# Patient Record
Sex: FEMALE | Race: WHITE | NOT HISPANIC OR LATINO
[De-identification: names, ages, dates, MRNs, and addresses within clinical notes are randomized per-mention and may not be internally consistent; named-entity substitution may affect disease eponyms.]

---

## 2017-03-24 ENCOUNTER — APPOINTMENT (OUTPATIENT)
Dept: NEUROSURGERY | Facility: CLINIC | Age: 82
End: 2017-03-24

## 2017-10-23 ENCOUNTER — EMERGENCY (EMERGENCY)
Facility: HOSPITAL | Age: 82
LOS: 1 days | Discharge: HOME | End: 2017-10-23
Attending: EMERGENCY MEDICINE
Payer: MEDICARE

## 2017-10-23 VITALS
TEMPERATURE: 98 F | SYSTOLIC BLOOD PRESSURE: 146 MMHG | RESPIRATION RATE: 16 BRPM | OXYGEN SATURATION: 98 % | HEART RATE: 63 BPM | DIASTOLIC BLOOD PRESSURE: 65 MMHG | HEIGHT: 61 IN | WEIGHT: 167.99 LBS

## 2017-10-23 VITALS
HEART RATE: 60 BPM | SYSTOLIC BLOOD PRESSURE: 131 MMHG | OXYGEN SATURATION: 98 % | DIASTOLIC BLOOD PRESSURE: 81 MMHG | RESPIRATION RATE: 16 BRPM | TEMPERATURE: 98 F

## 2017-10-23 PROCEDURE — 99285 EMERGENCY DEPT VISIT HI MDM: CPT | Mod: 25

## 2017-10-23 PROCEDURE — 90471 IMMUNIZATION ADMIN: CPT

## 2017-10-23 PROCEDURE — 90715 TDAP VACCINE 7 YRS/> IM: CPT

## 2017-10-23 PROCEDURE — 99284 EMERGENCY DEPT VISIT MOD MDM: CPT | Mod: 25

## 2017-10-23 PROCEDURE — 12002 RPR S/N/AX/GEN/TRNK2.6-7.5CM: CPT

## 2017-10-23 PROCEDURE — 70450 CT HEAD/BRAIN W/O DYE: CPT

## 2017-10-23 PROCEDURE — 70450 CT HEAD/BRAIN W/O DYE: CPT | Mod: 26

## 2017-10-23 RX ORDER — TETANUS TOXOID, REDUCED DIPHTHERIA TOXOID AND ACELLULAR PERTUSSIS VACCINE, ADSORBED 5; 2.5; 8; 8; 2.5 [IU]/.5ML; [IU]/.5ML; UG/.5ML; UG/.5ML; UG/.5ML
0.5 SUSPENSION INTRAMUSCULAR ONCE
Qty: 0 | Refills: 0 | Status: COMPLETED | OUTPATIENT
Start: 2017-10-23 | End: 2017-10-23

## 2017-10-23 RX ADMIN — TETANUS TOXOID, REDUCED DIPHTHERIA TOXOID AND ACELLULAR PERTUSSIS VACCINE, ADSORBED 0.5 MILLILITER(S): 5; 2.5; 8; 8; 2.5 SUSPENSION INTRAMUSCULAR at 12:24

## 2017-10-23 NOTE — ED ADULT NURSE NOTE - OBJECTIVE STATEMENT
Pt AOx3, ambulatory, s/p fall and head trauma. Laceration in back of head 4 cm long, mild bleeding noted. Pt denies syncope or loss of concious. Pt denies change in vision or dizziness. No acute distress noted. Seen by Dr. Serrano, zoë placed on laceration.

## 2017-10-23 NOTE — ED ADULT TRIAGE NOTE - CHIEF COMPLAINT QUOTE
lost balance and hit her head on the wall while bending to  a paper, No LOC. laceration on back of head.

## 2017-10-23 NOTE — ED PROVIDER NOTE - PHYSICAL EXAMINATION
Gen: Alert, NAD  Head: NC, 4 cm lac on occiput    Eyes: PERRL, EOMI, normal lids/conjunctiva  ENT: normal hearing, patent oropharynx without erythema/exudate, uvula midline  Neck: supple, no tenderness, Trachea midline  Pulm: Bilateral BS, normal resp effort, no wheeze/stridor/retractions  CV: RRR, no M/R/G, 2+ radial and dp pulses bl, no edema  Abd: soft, NT/ND, +BS, no hepatosplenomegaly  Mskel: extremities x4 with normal ROM and no joint effusions. no ctl spine ttp.   Skin: 4 cm laceration posterior occiput down to galea  Neuro: AAOx3, no sensory/motor deficits, CN 2-12 intact

## 2017-10-23 NOTE — ED PROVIDER NOTE - OBJECTIVE STATEMENT
Pertinent PMH/PSH/FHx/SHx and Review of Systems contained within:  87F hx of htn and arthritis pw mechanical fall. was bending down to pick something up today and fall back into wall, sustaining lac to occiput. no loc, no nausea, vomiting, vision change. bled initially tnen became hemostatic. patient denies recent tetanus  Fh and Sh not otherwise contributory  ROS otherwise negative
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
## 2017-10-27 DIAGNOSIS — I10 ESSENTIAL (PRIMARY) HYPERTENSION: ICD-10-CM

## 2017-10-27 DIAGNOSIS — M19.90 UNSPECIFIED OSTEOARTHRITIS, UNSPECIFIED SITE: ICD-10-CM

## 2017-10-27 DIAGNOSIS — S01.01XA LACERATION WITHOUT FOREIGN BODY OF SCALP, INITIAL ENCOUNTER: ICD-10-CM
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
## 2017-10-30 ENCOUNTER — EMERGENCY (EMERGENCY)
Facility: HOSPITAL | Age: 82
LOS: 1 days | Discharge: HOME | End: 2017-10-30
Attending: EMERGENCY MEDICINE | Admitting: EMERGENCY MEDICINE
Payer: MEDICARE

## 2017-10-30 VITALS
TEMPERATURE: 98 F | DIASTOLIC BLOOD PRESSURE: 70 MMHG | OXYGEN SATURATION: 98 % | RESPIRATION RATE: 18 BRPM | SYSTOLIC BLOOD PRESSURE: 156 MMHG | WEIGHT: 167.99 LBS | HEIGHT: 61 IN | HEART RATE: 64 BPM

## 2017-10-30 PROCEDURE — G0463: CPT

## 2017-10-30 NOTE — ED PROVIDER NOTE - OBJECTIVE STATEMENT
86 y/o female with PMHx of HTN and Arthritis presents to the ED for staple removal s/p hitting her head against a wall 1 week ago. Pt received 19 staples. Pt denies head pain, dizziness, fever, chills, nausea, vomiting, or any other complaints. NKDA.

## 2017-11-07 DIAGNOSIS — Z48.02 ENCOUNTER FOR REMOVAL OF SUTURES: ICD-10-CM

## 2021-12-17 NOTE — ED ADULT NURSE NOTE - CCCP TRG CHIEF CMPLNT
Personalized Preventive Plan for [facility] - 12/17/2021  Medicare offers a range of preventive health benefits. Some of the tests and screenings are paid in full while other may be subject to a deductible, co-insurance, and/or copay. Some of these benefits include a comprehensive review of your medical history including lifestyle, illnesses that may run in your family, and various assessments and screenings as appropriate. After reviewing your medical record and screening and assessments performed today your provider may have ordered immunizations, labs, imaging, and/or referrals for you. A list of these orders (if applicable) as well as your Preventive Care list are included within your After Visit Summary for your review. Other Preventive Recommendations:    · A preventive eye exam performed by an eye specialist is recommended every 1-2 years to screen for glaucoma; cataracts, macular degeneration, and other eye disorders. · A preventive dental visit is recommended every 6 months. · Try to get at least 150 minutes of exercise per week or 10,000 steps per day on a pedometer . · Order or download the FREE \"Exercise & Physical Activity: Your Everyday Guide\" from [employer]. Call 7-893.595.8764 or search [employer] online. · You need 0241-3937 mg of calcium and 4291-0995 IU of vitamin D per day. It is possible to meet your calcium requirement with diet alone, but a vitamin D supplement is usually necessary to meet this goal.  · When exposed to the sun, use a sunscreen that protects against both UVA and UVB radiation with an SPF of 30 or greater. Reapply every 2 to 3 hours or after sweating, drying off with a towel, or swimming. · Always wear a seat belt when traveling in a car. Always wear a helmet when riding a bicycle or motorcycle.
suture/staple removal

## 2023-04-18 PROBLEM — M19.90 UNSPECIFIED OSTEOARTHRITIS, UNSPECIFIED SITE: Chronic | Status: ACTIVE | Noted: 2017-10-30

## 2023-04-18 PROBLEM — I10 ESSENTIAL (PRIMARY) HYPERTENSION: Chronic | Status: ACTIVE | Noted: 2017-10-30

## 2023-04-24 ENCOUNTER — NON-APPOINTMENT (OUTPATIENT)
Age: 88
End: 2023-04-24

## 2023-04-25 ENCOUNTER — APPOINTMENT (OUTPATIENT)
Dept: PHYSICAL MEDICINE AND REHAB | Facility: CLINIC | Age: 88
End: 2023-04-25
Payer: MEDICARE

## 2023-04-25 ENCOUNTER — NON-APPOINTMENT (OUTPATIENT)
Age: 88
End: 2023-04-25

## 2023-04-25 VITALS
SYSTOLIC BLOOD PRESSURE: 184 MMHG | TEMPERATURE: 93.4 F | DIASTOLIC BLOOD PRESSURE: 91 MMHG | OXYGEN SATURATION: 99 % | HEART RATE: 61 BPM

## 2023-04-25 PROCEDURE — 99213 OFFICE O/P EST LOW 20 MIN: CPT

## 2023-04-25 NOTE — HISTORY OF PRESENT ILLNESS
[FreeTextEntry1] : Patient returns after being seen in 2015.  She returns with a recurrence of her pain radiating down her right leg.  She was given Prednisone by her PCP which helped her significantly but her pain has started to return.  She is here to discuss.

## 2023-04-25 NOTE — ASSESSMENT
[FreeTextEntry1] : 89 year old female with low back and lumbar radicular pain now returned.  Given the nature of the patient's complaints and lack of significant improvement following more conservative measures, we will obtain repeat MRI lumbar spine to help delineate the exact etiology of the patient's pain.  The patient will return to see me once the study is complete so that we may review the results and discuss further treatment options.\par

## 2023-05-10 ENCOUNTER — TRANSCRIPTION ENCOUNTER (OUTPATIENT)
Age: 88
End: 2023-05-10

## 2023-05-10 ENCOUNTER — OUTPATIENT (OUTPATIENT)
Dept: OUTPATIENT SERVICES | Facility: HOSPITAL | Age: 88
LOS: 1 days | End: 2023-05-10
Payer: MEDICARE

## 2023-05-10 ENCOUNTER — APPOINTMENT (OUTPATIENT)
Dept: MRI IMAGING | Facility: CLINIC | Age: 88
End: 2023-05-10
Payer: MEDICARE

## 2023-05-10 DIAGNOSIS — M54.50 LOW BACK PAIN, UNSPECIFIED: ICD-10-CM

## 2023-05-10 DIAGNOSIS — M48.061 SPINAL STENOSIS, LUMBAR REGION WITHOUT NEUROGENIC CLAUDICATION: ICD-10-CM

## 2023-05-10 DIAGNOSIS — M51.36 OTHER INTERVERTEBRAL DISC DEGENERATION, LUMBAR REGION: ICD-10-CM

## 2023-05-10 DIAGNOSIS — M43.10 SPONDYLOLISTHESIS, SITE UNSPECIFIED: ICD-10-CM

## 2023-05-10 PROCEDURE — 72148 MRI LUMBAR SPINE W/O DYE: CPT

## 2023-05-10 PROCEDURE — 72148 MRI LUMBAR SPINE W/O DYE: CPT | Mod: 26,MH

## 2023-05-19 ENCOUNTER — APPOINTMENT (OUTPATIENT)
Dept: PHYSICAL MEDICINE AND REHAB | Facility: CLINIC | Age: 88
End: 2023-05-19
Payer: MEDICARE

## 2023-05-19 VITALS
TEMPERATURE: 95.4 F | HEART RATE: 65 BPM | DIASTOLIC BLOOD PRESSURE: 83 MMHG | SYSTOLIC BLOOD PRESSURE: 187 MMHG | OXYGEN SATURATION: 98 %

## 2023-05-19 PROCEDURE — 99213 OFFICE O/P EST LOW 20 MIN: CPT

## 2023-05-19 NOTE — DATA REVIEWED
[MRI] : MRI [FreeTextEntry1] : \par   MR Lumbar Spine No Cont             Final\par \par No Documents Attached\par \par \par \par \par   EXAM: [id number] - MR SPINE LUMBAR  - ORDERED BY: AMINA GILLESPIE\par \par \par PROCEDURE DATE:  05/10/2023\par \par \par \par INTERPRETATION:  CLINICAL INFORMATION: Low back pain. Stenosis and spondylolisthesis.\par \par ADDITIONAL CLINICAL INFORMATION: Spondylolisthesis M43.16\par \par TECHNIQUE: Multiplanar, multisequence MRI was performed of the lumbar spine.\par IV Contrast: NONE\par \par PRIOR STUDIES: Lumbar spine MRI 10/12/2012.\par \par FINDINGS:\par \par LOCALIZER: No additional findings.\par SPINAL SEGMENTATION: The study assumes 5 non-rib bearing lumbar type vertebral bodies.\par BONES: Vertebral body heights are maintained. Large Schmorl's node formation in the inferior endplate of L2. Modic type II degenerative endplate changes at the L2-L4 levels.\par ALIGNMENT: 6 mm retrolisthesis of L2 on L3, which is new when compared to prior study. 8 mm grade 1 anterolisthesis of L4 on L5, slightly increased in severity when compared to prior study. Mild leftward curvature of the lumbar spine centered at the level of L3.\par SACROILIAC JOINTS/SACRUM: There is no sacral fracture. The SI joints are partially visualized but are intact.\par CONUS AND CAUDA EQUINA: The distal cord and conus are normal in signal. Conus terminates at L2.\par VISUALIZED INTRAPELVIC/INTRA-ABDOMINAL SOFT TISSUES: Right renal cyst formation. Mild left hydronephrosis. Hydropic appearance of the gallbladder with cholelithiasis.\par PARASPINAL SOFT TISSUES: Fatty infiltration of the posterior paraspinous muscles.\par \par \par INDIVIDUAL LEVELS:\par \par LOWER THORACIC SPINE: No spinal canal or neuroforaminal stenosis.\par \par T12-L1: Left paracentral disc protrusion causing mild spinal canal stenosis with mild left neural foraminal stenosis.\par L1-L2: Disc bulge with facet arthropathy and infolding of ligamentum flavum causing mild spinal canal stenosis with mild bilateral neural foraminal stenosis. Findings are increased when compared to prior study.\par L2-L3: Retrolisthesis, disc bulge, and bilateral facet arthropathy causing severe central stenosis with severe bilateral neural foraminal stenosis. Findings are new when compared to prior study.\par L3-L4: Disc bulge with infolding of ligamentum flavum and bilateral facet arthropathy causing severe spinal canal stenosis with severe bilateral neural foraminal stenosis. Findings are similar to prior study.\par L4-L5: Disc bulge with bilateral facet arthropathy causing moderate to severe central stenosis with severe bilateral neural foraminal stenosis. Findings are increased in severity when compared to prior study.\par L5-S1: Facet arthropathy causing mild bilateral neural foraminal stenosis. No spinal canal stenosis. Findings are new when compared to prior study.\par \par \par IMPRESSION:\par \par 1.  New retrolisthesis of L2 on L3. Grade 1 anterolisthesis of L4 on L5 is slightly increased when compared to prior exam.\par 2.  Mild left hydronephrosis.\par 3.  Multilevel spondylosis of the lumbar spine, overall increased in severity when compared to prior MRI of October 2012.\par 4.  At L2-3 there is severe central with severe bilateral neural foraminal stenosis.\par 5.  At L3-4 there is severe central with severe bilateral neural foraminal stenosis.\par 6.  At L4-5 there is severe central with severe bilateral neural foraminal stenosis.\par \par --- End of Report ---\par \par \par \par \par \par \par RITESH VALDEZ MD; Attending Radiologist\par This document has been electronically signed. May 16 2023 12:09PM\par \par  \par \par  Ordered by: AMINA GILLESPIE       Collected/Examined: 10May2023 05:54PM       \par Verified by: AMINA GILLESPIE 16May2023 02:55PM       \par  Result Communication: Schedule appointment to discuss results;\par Stage: Final       \par  Performed at: [facility]       Resulted: 16May2023 11:52AM       Last Updated: 16May2023 02:55PM       Accession: [id number]

## 2023-05-19 NOTE — ASSESSMENT
[FreeTextEntry1] : 89 year old female with low back and lumbar radicular pain secondary to stenosis.  She will continue with PT and return to see me once she has completed her PT to report on her progress but certainly sooner with any questions or concerns.

## 2023-05-19 NOTE — HISTORY OF PRESENT ILLNESS
[FreeTextEntry1] : Patient returns after completing  a new MRI of the lumbar spine.  She has noticed some numbness in the right lower leg and pain in the right hip.  She is here to review the results.

## 2023-07-07 ENCOUNTER — APPOINTMENT (OUTPATIENT)
Dept: PHYSICAL MEDICINE AND REHAB | Facility: CLINIC | Age: 88
End: 2023-07-07
Payer: MEDICARE

## 2023-07-07 VITALS — OXYGEN SATURATION: 98 % | SYSTOLIC BLOOD PRESSURE: 174 MMHG | HEART RATE: 67 BPM | DIASTOLIC BLOOD PRESSURE: 94 MMHG

## 2023-07-07 VITALS — SYSTOLIC BLOOD PRESSURE: 146 MMHG | DIASTOLIC BLOOD PRESSURE: 86 MMHG

## 2023-07-07 DIAGNOSIS — M54.2 CERVICALGIA: ICD-10-CM

## 2023-07-07 DIAGNOSIS — M43.10 SPONDYLOLISTHESIS, SITE UNSPECIFIED: ICD-10-CM

## 2023-07-07 DIAGNOSIS — M48.061 SPINAL STENOSIS, LUMBAR REGION WITHOUT NEUROGENIC CLAUDICATION: ICD-10-CM

## 2023-07-07 DIAGNOSIS — M54.50 LOW BACK PAIN, UNSPECIFIED: ICD-10-CM

## 2023-07-07 DIAGNOSIS — M51.36 OTHER INTERVERTEBRAL DISC DEGENERATION, LUMBAR REGION: ICD-10-CM

## 2023-07-07 PROCEDURE — 99213 OFFICE O/P EST LOW 20 MIN: CPT

## 2023-07-07 NOTE — HISTORY OF PRESENT ILLNESS
[FreeTextEntry1] : Patient returns after being seen in May.  She has been going to PT and OT and she feels stronger and is able to climb stairs independently.  She is here to discuss her progress.

## 2023-07-07 NOTE — ASSESSMENT
[FreeTextEntry1] : 89 year old female with low back pain, lumbar radicular pain and neck pain.  She will continue with more PT and I will provide her with a new prescription.

## 2023-11-03 ENCOUNTER — APPOINTMENT (OUTPATIENT)
Dept: PHYSICAL MEDICINE AND REHAB | Facility: CLINIC | Age: 88
End: 2023-11-03

## 2024-10-01 NOTE — ED PROVIDER NOTE - NS ED MD EM SELECTION
Problem: Potential for Falls  Goal: Patient will remain free of falls  Description: INTERVENTIONS:  - Educate patient/family on patient safety including physical limitations  - Instruct patient to call for assistance with activity   - Consult OT/PT to assist with strengthening/mobility   - Keep Call bell within reach  - Keep bed low and locked with side rails adjusted as appropriate  - Keep care items and personal belongings within reach  - Initiate and maintain comfort rounds  - Make Fall Risk Sign visible to staff  - Offer Toileting every  Hours, in advance of need  - Initiate/Maintain alarm  - Obtain necessary fall risk management equipment:   - Apply yellow socks and bracelet for high fall risk patients  - Consider moving patient to room near nurses station  Outcome: Progressing      [id number] Comprehensive

## 2025-01-08 ENCOUNTER — APPOINTMENT (OUTPATIENT)
Dept: PHYSICAL MEDICINE AND REHAB | Facility: CLINIC | Age: 89
End: 2025-01-08

## 2025-03-04 ENCOUNTER — APPOINTMENT (OUTPATIENT)
Dept: PHYSICAL MEDICINE AND REHAB | Facility: CLINIC | Age: 89
End: 2025-03-04